# Patient Record
Sex: FEMALE | URBAN - METROPOLITAN AREA
[De-identification: names, ages, dates, MRNs, and addresses within clinical notes are randomized per-mention and may not be internally consistent; named-entity substitution may affect disease eponyms.]

---

## 2023-05-02 ENCOUNTER — TELEPHONE (OUTPATIENT)
Dept: FAMILY MEDICINE | Facility: CLINIC | Age: 38
End: 2023-05-02

## 2023-05-02 RX ORDER — SERTRALINE HYDROCHLORIDE 100 MG/1
TABLET, FILM COATED ORAL
Qty: 90 TABLET | Refills: 1 | OUTPATIENT
Start: 2023-05-02

## 2023-05-02 NOTE — TELEPHONE ENCOUNTER
Pt has never been seen at [facility] clinic please disregard refill.  Thanks

## 2023-05-02 NOTE — TELEPHONE ENCOUNTER
April is requesting a refill on her Sertraline 100 mg, last written on 10/31/22 by Nazario Duran        Thank You,  aSri Waters, [facility], [place]

## 2023-05-04 NOTE — TELEPHONE ENCOUNTER
Please call patient: Patient was seen by this provider at the previous clinic ([facility]).  As provider is in a new healthcare system, patient would need to schedule an appointment to establish cares for ongoing medication management.  In the meantime, she may reach out to the [facility] for refills.    Nazario Duran MD  [facility] [place] [address]  Phone: [phone]  Fax: [phone]    5/4/2023  2:56 PM

## 2023-05-05 NOTE — TELEPHONE ENCOUNTER
Unfortunately patient does not have a phone # in her chart.   If patient returns call please notify of Dr. Duran's message below and schedule to establish care.